# Patient Record
Sex: MALE | Race: WHITE | NOT HISPANIC OR LATINO | URBAN - METROPOLITAN AREA
[De-identification: names, ages, dates, MRNs, and addresses within clinical notes are randomized per-mention and may not be internally consistent; named-entity substitution may affect disease eponyms.]

---

## 2017-11-19 ENCOUNTER — HOSPITAL ENCOUNTER (EMERGENCY)
Facility: CLINIC | Age: 27
Discharge: HOME | End: 2017-11-19
Attending: EMERGENCY MEDICINE | Admitting: EMERGENCY MEDICINE
Payer: COMMERCIAL

## 2017-11-19 ENCOUNTER — APPOINTMENT (OUTPATIENT)
Dept: GENERAL RADIOLOGY | Facility: CLINIC | Age: 27
End: 2017-11-19
Attending: EMERGENCY MEDICINE
Payer: COMMERCIAL

## 2017-11-19 VITALS
HEART RATE: 93 BPM | SYSTOLIC BLOOD PRESSURE: 138 MMHG | OXYGEN SATURATION: 99 % | RESPIRATION RATE: 18 BRPM | TEMPERATURE: 96.4 F | DIASTOLIC BLOOD PRESSURE: 109 MMHG

## 2017-11-19 DIAGNOSIS — S20.212A RIB CONTUSION, LEFT, INITIAL ENCOUNTER: ICD-10-CM

## 2017-11-19 DIAGNOSIS — F10.10 ALCOHOL ABUSE: ICD-10-CM

## 2017-11-19 LAB — INTERPRETATION ECG - MUSE: NORMAL

## 2017-11-19 PROCEDURE — 99284 EMERGENCY DEPT VISIT MOD MDM: CPT | Mod: 25

## 2017-11-19 PROCEDURE — 71020 XR CHEST 2 VW: CPT

## 2017-11-19 PROCEDURE — 93005 ELECTROCARDIOGRAM TRACING: CPT

## 2017-11-19 RX ORDER — OXYCODONE HYDROCHLORIDE 5 MG/1
10 TABLET ORAL
Status: DISCONTINUED | OUTPATIENT
Start: 2017-11-19 | End: 2017-11-19

## 2017-11-19 ASSESSMENT — ENCOUNTER SYMPTOMS
SHORTNESS OF BREATH: 0
ABDOMINAL PAIN: 0
VOMITING: 0
COUGH: 0
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
## 2017-11-19 NOTE — DISCHARGE INSTRUCTIONS
Chest Contusion    A contusion is a bruise to the skin, muscle, or ribs. It may cause pain, tenderness, and swelling. It may turn the skin purple until it heals. Contusions take a few days to a few weeks to heal.  Home care  Follow these guidelines when caring for yourself at home:    Rest. Don t do any heavy lifting or strenuous activity. Don t do any activity that causes pain.    Put an ice pack on the injured area. Do this for 20 minutes every 1 to 2 hours the first day. You can make an ice pack by wrapping a plastic bag of ice cubes in a thin towel. Continue to use the ice pack 3 to 4 times a day for the next 2 days. Then use the ice pack as needed to ease pain and swelling.    After 1 to 2 days you may put a warm compress on the area. Do this for 10 minutes several times a day. A warm compress is a clean cloth that s damp with warm water.    Hold a pillow to the affected area when you cough. This will help ease pain.    You may use over-the-counter pain medicine such as acetaminophen or ibuprofen to control pain, unless another pain medicine was prescribed. If you have chronic liver or kidney disease, talk with your healthcare provider before using these medicines. Also talk with your provider if you ve had a stomach ulcer or gastrointestinal bleeding.  Follow-up care  Follow up with your healthcare provider, or as advised.  When to seek medical advice  Call your healthcare provider right away if any of these occur:    New abdominal pain or abdominal pain that gets worse    Fever of 100.4 F (38 C) or higher, or as directed by your healthcare provider  When to call 911  Call 911 or get immediate medical attention if any of these occur:     Dizziness, weakness, or fainting    Shortness of breath, trouble breathing, or breathing fast    Chest pain gets worse when you breathe    Severe pain that comes on suddenly or lasts more than an hour  Date Last Reviewed: 5/1/2017 2000-2017 [employer]  "[address], [place] [address]. All rights reserved. This information is not intended as a substitute for professional medical care. Always follow your healthcare professional's instructions.          Alcohol Abuse  Alcoholic drinks are harmful when you have too many of them. There is no set number of drinks that defines too much. Drinking that disrupts your life or your health is called alcohol abuse. Alcohol abuse can hurt your relationships with others. You may lose friends, a spouse, or even your job. You may be abusing alcohol if any of the following are true for you:    Duties at home or with  suffer because of drinking.    Duties at work or in school suffer because of drinking.    You have missed work or school because of drinking.    You use alcohol while driving or operating machinery.    You have legal problems such as arrests due to drinking.    You keep drinking even though it causes serious problems in your life.  Health effects  Alcohol abuse causes health problems. Sometimes this can happen after only drinking a  little.\" There is no set number of drinks or amount of alcohol that defines too much. The more you drink at one time, and the more often you drink determine both the short-term and long-term health effects. It affects all parts of your body and your health, including your:    Brain. Alcohol is a central nervous system depressant. It can damage parts of the brain that affect your balance, memory, thinking, and emotions. It can cause memory loss, blackouts, depression, agitation, sleep cycle changes, and seizures. These changes may or may not be reversible.    Heart and vascular system. Alcohol affects multiple areas. It can damage heart muscle causing cardiomyopathy, which is a weakening and stretching of the heart muscle. This can lead to trouble breathing, an irregular heartbeat, atrial fibrillation, leg swelling, and heart failure. Alcohol use makes the blood vessels " stiffen causing high blood pressure. All of these problems increase your risk of having heart attacks or strokes.    Liver. Alcohol causes fat to build up in the liver, affecting its normal function. This increases the risk for hepatitis, leading to abdominal pain, appetite loss, jaundice, bleeding problems, liver fibrosis, and cirrhosis. This, in turn, can affect your ability to fight off infections, and can cause diabetes. The liver changes prevent it from removing toxins in your blood that can cause encephalopathy, which may show with confusion, altered level of consciousness, personality changes, memory loss, seizures, coma, and death.    Pancreas. Alcohol can cause inflammation of the pancreas, or pancreatitis. This can cause abdominal pain, fever, and diabetes.    Immune system. Alcohol weakens your immune system in a number of ways. It suppresses your immune system making it harder to fight infections and colds. It also increases the chance of getting pneumonia and tuberculosis.    Cancer. Alcohol is a risk factor for developing cancer of the mouth, esophagus, pharynx, larynx, liver, and breast.    Sexual function. Alcohol can lead to sexual problems.  Home care  The following guidelines will help you deal with alcohol abuse:    Admit you have a problem with alcohol.    Ask for help from your healthcare provider and trusted family members or close friends.    Get help from people trained in dealing with alcohol abuse. This may be individual counseling or group therapy, or it may be a supervised alcohol treatment program.    Join a self-help group for alcohol abuse such as Alcoholics Anonymous (AA).    Avoid people who abuse alcohol or tempt you to drink.  Follow-up care  Follow up as advised by the healthcare provider, or as advised. Contact these groups to get help:    Alcoholics Anonymous (AA): Go to www.aa.org or check the phone book for meetings near you.    National Alcohol and Substance Abuse  Information Center ([facility]): 634-158-8144 www.TapMyBack.Hoodinn    National [place] on Alcoholism and Drug Dependence (NCADD): 678-RIT-XEJP (677-0581) www.ncadd.org  Call 911  Call 911 if any of these occur:    Trouble breathing or slow irregular breathing    Chest pain    Sudden weakness on one side of your body or sudden trouble speaking    Heavy bleeding or vomiting blood    Very drowsy or trouble awakening    Fainting or loss of consciousness    Rapid heart rate    Seizure  When to seek medical care  Call your healthcare provider right away if any of these occur:     Confusion    Hallucinations (seeing, hearing, or feeling things that aren t there)    Pain in your upper abdomen that gets worse    Repeated vomiting or black or tarry stools    Severe shakiness  Date Last Reviewed: 6/1/2016 2000-2017 [employer]. [address] [place] [address]. All rights reserved. This information is not intended as a substitute for professional medical care. Always follow your healthcare professional's instructions.

## 2017-11-19 NOTE — ED NOTES
Pt was very uncooperative during ekg . Pt was on the phone with  and not willing to stay still. Writer  heard pt talking with  asking for her to wait as he was trying to leave. PT then after the ekg got up and walked out. Please see Physician not about PT leaving before discharge.